# Patient Record
Sex: MALE | Race: WHITE
[De-identification: names, ages, dates, MRNs, and addresses within clinical notes are randomized per-mention and may not be internally consistent; named-entity substitution may affect disease eponyms.]

---

## 2020-01-06 ENCOUNTER — HOSPITAL ENCOUNTER (OUTPATIENT)
Dept: HOSPITAL 63 - PMG | Age: 60
Discharge: HOME | End: 2020-01-06
Attending: PHYSICIAN ASSISTANT
Payer: COMMERCIAL

## 2020-01-06 DIAGNOSIS — R05: Primary | ICD-10-CM

## 2020-01-06 PROCEDURE — 71046 X-RAY EXAM CHEST 2 VIEWS: CPT

## 2020-01-06 NOTE — RAD
EXAM: Chest, 2 views.

 

HISTORY: Cough.

 

COMPARISON: 8/11/2015

 

FINDINGS: 2 views of the chest are obtained. There is no infiltrate, 

pleural effusion or pneumothorax. The heart is normal in size.

 

IMPRESSION: No acute pulmonary finding.

 

Electronically signed by: Aby Salas MD (1/6/2020 4:49 PM) [facility]